# Patient Record
Sex: MALE | Race: BLACK OR AFRICAN AMERICAN | Employment: FULL TIME | URBAN - METROPOLITAN AREA
[De-identification: names, ages, dates, MRNs, and addresses within clinical notes are randomized per-mention and may not be internally consistent; named-entity substitution may affect disease eponyms.]

---

## 2019-03-04 ENCOUNTER — OFFICE VISIT (OUTPATIENT)
Dept: FAMILY MEDICINE CLINIC | Age: 29
End: 2019-03-04

## 2019-03-04 VITALS
TEMPERATURE: 102.4 F | OXYGEN SATURATION: 98 % | DIASTOLIC BLOOD PRESSURE: 81 MMHG | RESPIRATION RATE: 16 BRPM | HEART RATE: 109 BPM | HEIGHT: 74 IN | WEIGHT: 219 LBS | SYSTOLIC BLOOD PRESSURE: 127 MMHG | BODY MASS INDEX: 28.11 KG/M2

## 2019-03-04 DIAGNOSIS — J02.9 SORE THROAT: Primary | ICD-10-CM

## 2019-03-04 RX ORDER — AMOXICILLIN 875 MG/1
875 TABLET, FILM COATED ORAL 2 TIMES DAILY
Qty: 20 TAB | Refills: 0 | Status: SHIPPED | OUTPATIENT
Start: 2019-03-04 | End: 2019-03-14

## 2019-03-04 RX ORDER — AMLODIPINE AND OLMESARTAN MEDOXOMIL 5; 20 MG/1; MG/1
1 TABLET ORAL
COMMUNITY

## 2019-03-04 NOTE — LETTER
NOTIFICATION RETURN TO WORK / SCHOOL 
 
3/4/2019 4:58 PM 
 
Mr. Ashwin Vogt III 
[address] [address] [facility] [id number] To Whom It May Concern: 
 
Ashwin Vogt III is currently under the care of [address] [place] [address]. He will return to work/school on: 3/6/19 If there are questions or concerns please have the patient contact our office. Sincerely, Lili Benítez MD

## 2019-03-05 LAB
S PYO AG THROAT QL: NEGATIVE
VALID INTERNAL CONTROL?: YES

## 2019-03-25 NOTE — PROGRESS NOTES
HISTORY OF PRESENT ILLNESS 
Blaire Drew III is a 29 y.o. male. HPI This 29year old states he gets very frequent bouts of \"tonsillitis\"- presents c/o my tonsils are swollen and red. No fever. Eating and drinking [id number] [place]  Review of Systems Constitutional: Negative for chills and fever. HENT: Positive for sore throat. Negative for congestion. Respiratory: Negative for cough. Cardiovascular: Negative for chest pain. Gastrointestinal: Negative for nausea. Musculoskeletal: Negative for myalgias. Physical Exam  
Constitutional: He appears well-developed and well-nourished. No distress. HENT:  
Right Ear: External ear normal.  
Left Ear: External ear normal.  
Nose: Nose normal.  
Mouth/Throat: Oropharyngeal exudate present. Exudative pharynx, erythematous Eyes: Pupils are equal, round, and reactive to light. Neck: Neck supple. Cardiovascular: Normal rate, regular rhythm and normal heart sounds. No murmur heard. Pulmonary/Chest: Effort normal and breath sounds normal. No respiratory distress. He has no wheezes. He has no rales. Abdominal: Soft. There is no tenderness. Skin: He is not diaphoretic. ASSESSMENT and PLAN 
  ICD-10-CM ICD-9-CM 1. Sore throat J02.9 462 AMB POC RAPID STREP A  
will treat despite negative screen Start on amoxicillin Follow up if not better Precautions given

## 2019-05-08 ENCOUNTER — OFFICE VISIT (OUTPATIENT)
Dept: NEUROLOGY | Age: 29
End: 2019-05-08

## 2019-05-08 VITALS
SYSTOLIC BLOOD PRESSURE: 164 MMHG | OXYGEN SATURATION: 98 % | HEART RATE: 80 BPM | BODY MASS INDEX: 29.39 KG/M2 | WEIGHT: 217 LBS | HEIGHT: 72 IN | DIASTOLIC BLOOD PRESSURE: 96 MMHG | RESPIRATION RATE: 16 BRPM

## 2019-05-08 DIAGNOSIS — G40.909 NONINTRACTABLE EPILEPSY WITHOUT STATUS EPILEPTICUS, UNSPECIFIED EPILEPSY TYPE (HCC): Primary | ICD-10-CM

## 2019-05-08 NOTE — PROGRESS NOTES
[facility] and [address] Medley at [facility] at [facility] [address] [place], [address] [place] [address] [address] [facility], [address] [place] [address] 
[phone] Office 
[phone] Referring: Self Chief Complaint Patient presents with  New Patient  Seizure  
  h/o, last seizure 10+ yrs ago, medication free for 5 yrs. Moving to [facility] in June and wanted to know if he needed any testing 80-year-old right-handed gentleman who presents today for evaluation of what he calls a history of seizures. He tells me back in 2008 when he was 16years of age he had his first seizure. This was said to be a generalized convulsion. He is walking in the mall and had this. He subsequently was evaluated with MRIs and EEGs and those were normal.  Second seizure was the morning after his eugenio prom when he was sleep deprived. Again said to be a convulsion. His third seizure he was at work and this occurred in 2009. He again fell out into the floor head but was said to be a convulsion. He never bit his tongue. Never wet himself. He was put on Keppra and was on Keppra for a number of years. He was followed by Charleen Mclean. After he had been seizure-free for 2-3 years it was felt that he could have a trial of coming off of medicine. He was weaned off [address]. He is been seizure-free now for about 5 years. No occult seizure symptom. No overt seizure. Feels well on the medicine. He has never had a febrile convulsion, CNS infection, head trauma with loss of consciousness. No family history of seizure. He feels well. He took a new job with [employer] and he will be moving to [facility] [employer] [facility] and he wanted to be have an evaluation prior to that. No recent fever chills nausea vomiting diarrhea vertigo shortness of breath change in weight rash etc. 
 
 
Past Medical History:  
Diagnosis Date  Anxiety  Asthma  Depression  Hypertension  Stress Past Surgical History:  
Procedure Laterality Date  HX ORTHOPAEDIC Current Outpatient Medications Medication Sig Dispense Refill  amLODIPine-Olmesartan 5-20 mg tab Take 1 Tab by mouth.  BENICAR 20 mg tablet TAKE 1 TABLET BY MOUTH DAILY (Patient not taking: Reported on 5/8/2019) 90 tablet 0  
 clobetasol (TEMOVATE) 0.05 % ointment Apply  to affected area two (2) times a day. (Patient not taking: Reported on 5/8/2019) 45 g 0  
 levETIRAcetam (KEPPRA) 500 mg tablet Take  by mouth two (2) times a day. No Known Allergies Social History Tobacco Use  Smoking status: Never Smoker  Smokeless tobacco: Never Used Substance Use Topics  Alcohol use: Yes Comment: social  
 Drug use: No  
 
 
Family History Problem Relation Age of Onset  Hypertension Mother Review of Systems Pertinent positives and negatives as noted with remainder of comprehensive review negative Examination Visit Vitals BP (!) 160/96 (BP 1 Location: Left arm, BP Patient Position: Sitting) Pulse 80 Resp 16 Ht 6' (1.829 m) Wt 98.4 kg (217 lb) SpO2 98% BMI 29.43 kg/m² Pleasant, well appearing. Dress and grooming are appropriate. No scleral icterus is present. Oropharynx is clear. Supple neck without bruit appreciated. Heart regular. Pulses are symmetrical.  No edema in the lower extremities. Neurologically, he is awake, alert, oriented with normal speech, language, and cognition. Visual fields are full to direct confrontational testing. He has sharp disk margins bilaterally. Pupils react bilaterally. He has full versions without nystagmus. Face is symmetrical with symmetrical facial sensation. Tongue and palate are midline. Shoulder shrug is symmetrical. Hearing is intact to conversation. He has normal bulk and tone. There is no abnormal movement. There is no pronation or drift.  He is able to generate full strength in the upper and lower extremities and all muscle groups to direct confrontational testing. Reflexes are symmetrical in the upper and lower extremities bilaterally. His toes are down going. There is no [place]. There is no finger flexor reflex bilaterally. Finger nose finger and rapid alternating movements are normal. He has no ataxia or past pointing. Sensory examination is intact to primary modalities and there is no lateralization of sensation. There is no extinction. He is able to ambulate without difficulty. Impression/Plan 15-year-old gentleman with history of epilepsy diagnosed in adolescence seizure-free and weaned off medicine and he remains seizure-free. We discussed that there still is a potential that he could potentially have a seizure although less likely the further he goes. We discussed good sleep hygiene. From my standpoint I do not see any reason to do any type of testing as he has clinically been stable. He will follow as needed and certainly when he moved to [facility] follow-up with neurology as needed Nat Thomas MD 
 
This note was created using voice recognition software. Despite editing, there may be syntax errors. This note will not be viewable in [address]

## 2019-05-08 NOTE — PROGRESS NOTES
Chief Complaint Patient presents with  New Patient  Seizure  
  h/o, last seizure 10+ yrs ago, medication free for 5 yrs. Moving to [place] in June and wanted to know if he needed any testing

## 2019-06-13 ENCOUNTER — OFFICE VISIT (OUTPATIENT)
Dept: FAMILY MEDICINE CLINIC | Age: 29
End: 2019-06-13

## 2019-06-13 VITALS
TEMPERATURE: 98.4 F | RESPIRATION RATE: 15 BRPM | SYSTOLIC BLOOD PRESSURE: 158 MMHG | DIASTOLIC BLOOD PRESSURE: 96 MMHG | OXYGEN SATURATION: 97 % | BODY MASS INDEX: 29.26 KG/M2 | HEIGHT: 72 IN | HEART RATE: 74 BPM | WEIGHT: 216 LBS

## 2019-06-13 DIAGNOSIS — M54.41 ACUTE RIGHT-SIDED LOW BACK PAIN WITH RIGHT-SIDED SCIATICA: Primary | ICD-10-CM

## 2019-06-13 RX ORDER — CYCLOBENZAPRINE HCL 10 MG
10 TABLET ORAL
Qty: 12 TAB | Refills: 0 | Status: SHIPPED | OUTPATIENT
Start: 2019-06-13

## 2019-06-13 RX ORDER — NAPROXEN 500 MG/1
500 TABLET ORAL 2 TIMES DAILY WITH MEALS
Qty: 14 TAB | Refills: 0 | Status: SHIPPED | OUTPATIENT
Start: 2019-06-13 | End: 2019-06-20

## 2019-06-13 RX ORDER — CHOLECALCIFEROL (VITAMIN D3) 125 MCG
CAPSULE ORAL
COMMUNITY
End: 2019-06-13 | Stop reason: DRUGHIGH

## 2019-06-13 NOTE — PATIENT INSTRUCTIONS

## 2019-06-13 NOTE — PROGRESS NOTES
Susan Rascon is a 29 y.o. male    Room 3    Pt reports some improvement with Aleve    Chief Complaint   Patient presents with   [address]     on right side especially Mon night, radiating down right leg - had lifted heavy box at home     1. Have you been to the ER, urgent care clinic since your last visit? Hospitalized since your last visit? no    2. Have you seen or consulted any other health care providers outside of the [address] [place] [address] since your last visit? Include any pap smears or colon screening.  no

## 2019-06-13 NOTE — PROGRESS NOTES
HISTORY OF PRESENT ILLNESS  Steve Barton III is a 29 y.o. male. Patient reports right-sided low back pain x 5 days with pain radiating down outer right leg. Sometimes has numbness sensation noted in lower leg and foot. Pain started after lifting a heavy box while packing to move. He has taken 2 doses of OTC Aleve with mild relief and tried icy hot. No redness or swelling or calf or leg. Pain is worse with sitting. Visit Vitals  BP (!) 158/96   Pulse 74   Temp 98.4 °F (36.9 °C) (Oral)   Resp 15   Ht 6' (1.829 m)   Wt 216 lb (98 kg)   SpO2 97%   BMI 29.29 kg/m²       HPI    Review of Systems   Musculoskeletal: Positive for back pain (radiating down right leg). Physical Exam   Constitutional: He is oriented to person, place, and time. He appears well-developed and well-nourished. Musculoskeletal:        Lumbar back: He exhibits tenderness (right SI joint radiating to lateral right calf; no redness or swelling, no warmth of calf; positive right straight leg raise) and pain. He exhibits normal range of motion and no swelling. Neurological: He is alert and oriented to person, place, and time. Skin: Skin is warm and dry. Psychiatric: He has a normal mood and affect. ASSESSMENT and PLAN    ICD-10-CM ICD-9-CM    1.  Acute right-sided low back pain with right-sided sciatica M54.41 724.2      724.3      Orders Placed This Encounter    DISCONTD: naproxen sodium (ALEVE) 220 mg cap    naproxen (NAPROSYN) 500 mg tablet    cyclobenzaprine (FLEXERIL) 10 mg tablet   Naproxen BID x 5-7 days  Ice application x 20 minutes every 2-3 hours  Stretching advised  Consider imaging/PT if no improvement in 2 weeks

## 2019-06-15 ENCOUNTER — HOSPITAL ENCOUNTER (EMERGENCY)
Age: 29
Discharge: HOME | End: 2019-06-15
Attending: EMERGENCY MEDICINE | Admitting: EMERGENCY MEDICINE
Payer: COMMERCIAL

## 2019-06-15 VITALS
HEIGHT: 75 IN | SYSTOLIC BLOOD PRESSURE: 169 MMHG | WEIGHT: 215 LBS | DIASTOLIC BLOOD PRESSURE: 89 MMHG | HEART RATE: 86 BPM | RESPIRATION RATE: 16 BRPM | BODY MASS INDEX: 26.73 KG/M2 | TEMPERATURE: 98.3 F | OXYGEN SATURATION: 96 %

## 2019-06-15 DIAGNOSIS — M79.604 RIGHT LEG PAIN: Primary | ICD-10-CM

## 2019-06-15 DIAGNOSIS — M54.16 LUMBAR RADICULOPATHY: ICD-10-CM

## 2019-06-15 LAB
COMMENT, HOLDF: NORMAL
D DIMER PPP FEU-MCNC: <0.19 MG/L FEU (ref 0–0.65)
SAMPLES BEING HELD,HOLD: NORMAL

## 2019-06-15 PROCEDURE — 36415 COLL VENOUS BLD VENIPUNCTURE: CPT

## 2019-06-15 PROCEDURE — 85379 FIBRIN DEGRADATION QUANT: CPT

## 2019-06-15 PROCEDURE — 99282 EMERGENCY DEPT VISIT SF MDM: CPT

## 2019-06-15 NOTE — ED NOTES
The patient was discharged home by Moon Gama and Imelda Lopez rn in stable condition. The patient is alert and oriented, is in no respiratory distress and has vital signs within normal limits . The patient's diagnosis, condition and treatment were explained to patient. The patient expressed understanding. No prescriptions given to pt. No work/school note given to pt. A discharge plan has been developed. A  was not involved in the process. Aftercare instructions were given to the patient. Pt will transport self home.

## 2019-06-15 NOTE — ED NOTES
Bedside, Verbal and Written shift change report given to Donavan Canales by Christa Bailey rn. Report included the following information SBAR, Kardex, ED Summary, [facility] and Recent Results.

## 2019-06-15 NOTE — ED NOTES
Bedside and Verbal shift change report given to [address] (oncoming nurse) by Audra Mckeon (offgoing nurse). Report included the following information SBAR and ED Summary.

## 2019-06-15 NOTE — ED PROVIDER NOTES
29year old male presenting to the ED for leg pain. Notes that earlier this week was moving heavy boxes, lifted with his legs instead of his back and had acute onset of a pulling pain that radiated into the RIGHT leg. Notes that the pain has somewhat persisted, will occasionally have tingling in the toes of the left foot. Notes that he became concerned when he felt like he had \"a constant Jd horse\" in the calf, came to the ED concerned for DVT. Patient denies hx VTE, recent immobilization, leg swelling. No blunt trauma. Pt saw urgent care who told him that pain was likely radicular in nature, given naprosyn and flexeril with improvement. Patient denies urinary retention, incontinence of bowel or bladder, perineal numbness. No long-term steroid use. PMHx: denies  Social: works for [employer]. Non-smoker.              Past Medical History:   Diagnosis Date    Anxiety     Asthma     Depression     Hypertension     Stress        Past Surgical History:   Procedure Laterality Date    HX ORTHOPAEDIC           Family History:   Problem Relation Age of Onset    Hypertension Mother        Social History     Socioeconomic History    Marital status: SINGLE     Spouse name: Not on file    Number of children: Not on file    Years of education: Not on file    Highest education level: Not on file   Occupational History    Not on file   Social Needs    Financial resource strain: Not on file    Food insecurity:     Worry: Not on file     Inability: Not on file    Transportation needs:     Medical: Not on file     Non-medical: Not on file   Tobacco Use    Smoking status: Never Smoker    Smokeless tobacco: Never Used   Substance and Sexual Activity    Alcohol use: Yes     Comment: social    Drug use: No    Sexual activity: Yes   Lifestyle    Physical activity:     Days per week: Not on file     Minutes per session: Not on file    Stress: Not on file   Relationships    Social connections: Talks on phone: Not on file     Gets together: Not on file     Attends [employer] service: Not on file     Active member of club or organization: Not on file     Attends meetings of clubs or organizations: Not on file     Relationship status: Not on file    Intimate partner violence:     Fear of current or ex partner: Not on file     Emotionally abused: Not on file     Physically abused: Not on file     Forced sexual activity: Not on file   Other Topics Concern    Not on file   Social History Narrative    Not on file         ALLERGIES: Patient has no known allergies. Review of Systems   Constitutional: Negative for fever. HENT: Negative for facial swelling. Cardiovascular: Negative for chest pain. Gastrointestinal: Negative for vomiting. Musculoskeletal: Positive for back pain and myalgias. Skin: Negative for wound. Neurological: Positive for numbness. Negative for syncope. All other systems reviewed and are negative. Vitals:    06/15/19 1824   BP: 169/89   Pulse: 86   Resp: 16   Temp: 98.3 °F (36.8 °C)   SpO2: 96%   Weight: 97.5 kg (215 lb)   Height: 6' 3\" (1.905 m)            Physical Exam   Constitutional: He is oriented to person, place, and time. He appears well-developed and well-nourished. No distress. Pleasant, well-appearing AA male   HENT:   Head: Normocephalic and atraumatic. Right Ear: External ear normal.   Left Ear: External ear normal.   Eyes: Conjunctivae are normal. No scleral icterus. Neck: Neck supple. No tracheal deviation present. Cardiovascular: Normal rate, regular rhythm and normal heart sounds. Exam reveals no gallop and no friction rub. No murmur heard. Pulmonary/Chest: Effort normal and breath sounds normal. No stridor. No respiratory distress. He has no wheezes. Abdominal: Soft. He exhibits no distension. Musculoskeletal: Normal range of motion.    No calf edema or TTP  Distal pulses intact   Neurological: He is alert and oriented to person, place, and time. 5/5 LE strength  + SLR at about 30 degrees   Skin: Skin is warm and dry. Psychiatric: He has a normal mood and affect. His behavior is normal.   Nursing note and vitals reviewed. MDM  Number of Diagnoses or Management Options  Lumbar radiculopathy:   Right leg pain:   Diagnosis management comments: 29year old male presenting for leg pain, radicular in characterization. No risk factors for DVT, however pt very concerned after talking with a female friend who was recently treated for blood clots. No edema or calf TTP, pt with symptoms and SLR c/w lumbar radiculopathy. Negative dimer. Discussed likely cause of pain, discussed care at home and return precautions.        Amount and/or Complexity of Data Reviewed  Clinical lab tests: ordered and reviewed  Discuss the patient with other providers: yes (Dr. Etienne Steven ED attending)           Procedures

## 2019-06-15 NOTE — DISCHARGE INSTRUCTIONS
Patient Education   Patient Education        Sciatica: Care Instructions  Your Care Instructions    Sciatica (say \"jox-GB-va-kuh\") is an irritation of one of the sciatic nerves, which come from the spinal cord in the lower back. The sciatic nerves and their branches extend down through the buttock to the foot. Sciatica can develop when an injured disc in the back presses against a spinal nerve root. Its main symptom is pain, numbness, or weakness that is often worse in the leg or foot than in the back. Sciatica often will improve and go away with time. Early treatment usually includes medicines and exercises to relieve pain. Follow-up care is a key part of your treatment and safety. Be sure to make and go to all appointments, and call your doctor if you are having problems. It's also a good idea to know your test results and keep a list of the medicines you take. How can you care for yourself at home? · Take pain medicines exactly as directed. ? If the doctor gave you a prescription medicine for pain, take it as prescribed. ? If you are not taking a prescription pain medicine, ask your doctor if you can take an over-the-counter medicine. · Use heat or ice to relieve pain. ? To apply heat, put a warm water bottle, heating pad set on low, or warm cloth on your back. Do not go to sleep with a heating pad on your skin. ? To use ice, put ice or a cold pack on the area for 10 to 20 minutes at a time. Put a thin cloth between the ice and your skin. · Avoid sitting if possible, unless it feels better than standing. · Alternate lying down with short walks. Increase your walking distance as you are able to without making your symptoms worse. · Do not do anything that makes your symptoms worse. When should you call for help? Call 911 anytime you think you may need emergency care.  For example, call if:    · You are unable to move a leg at all.   [facility] your doctor now or seek immediate medical care if:    · You have new or worse symptoms in your legs or buttocks. Symptoms may include:  ? Numbness or tingling. ? Weakness. ? Pain.     · You lose bladder or bowel control.    Watch closely for changes in your health, and be sure to contact your doctor if:    · You are not getting better as expected. Where can you learn more? Go to http://kennedy-carter.info/. Enter [phone] in the search box to learn more about \"Sciatica: Care Instructions. \"  Current as of: September 20, 2018  Content Version: 11.9  © 6212-9942 [employer]. Care instructions adapted under license by [employer] (which disclaims liability or warranty for this information). If you have questions about a medical condition or this instruction, always ask your healthcare professional. [address] any warranty or liability for your use of this information. Leg Pain: Care Instructions  Your Care Instructions  Many things can cause leg pain. Too much exercise or overuse can cause a muscle cramp (or charley horse). You can get leg cramps from not eating a balanced diet that has enough potassium, calcium, and other minerals. If you do not drink enough fluids or are taking certain medicines, you may develop leg cramps. Other causes of leg pain include injuries, blood flow problems, nerve damage, and twisted and enlarged veins (varicose veins). You can usually ease pain with self-care. Your doctor may recommend that you rest your leg and keep it elevated. Follow-up care is a key part of your treatment and safety. Be sure to make and go to all appointments, and call your doctor if you are having problems. It's also a good idea to know your test results and keep a list of the medicines you take. How can you care for yourself at home? · Take pain medicines exactly as directed. ? If the doctor gave you a prescription medicine for pain, take it as prescribed.   ? If you are not taking a prescription pain medicine, ask your doctor if you can take an over-the-counter medicine. · Take any other medicines exactly as prescribed. Call your doctor if you think you are having a problem with your medicine. · Rest your leg while you have pain, and avoid standing for long periods of time. · Prop up your leg at or above the level of your heart when possible. · Make sure you are eating a balanced diet that is rich in calcium, potassium, and magnesium, especially if you are pregnant. · If directed by your doctor, put ice or a cold pack on the area for 10 to 20 minutes at a time. Put a thin cloth between the ice and your skin. · Your leg may be in a splint, a brace, or an elastic bandage, and you may have crutches to help you walk. Follow your doctor's directions about how long to wear supports and how to use the crutches. When should you call for help? Call 911 anytime you think you may need emergency care. For example, call if:    · You have sudden chest pain and shortness of breath, or you cough up blood.     · Your leg is cool or pale or changes color.    Call your doctor now or seek immediate medical care if:    · You have increasing or severe pain.     · Your leg suddenly feels weak and you cannot move it.     · You have signs of a blood clot, such as:  ? Pain in your calf, back of the knee, thigh, or groin. ? Redness and swelling in your leg or groin.     · You have signs of infection, such as:  ? Increased pain, swelling, warmth, or redness. ? Red streaks leading from the sore area. ? Pus draining from a place on your leg. ? A fever.     · You cannot bear weight on your leg.    Watch closely for changes in your health, and be sure to contact your doctor if:    · You do not get better as expected. Where can you learn more? Go to http://kennedy-carter.info/. Enter X149 in the search box to learn more about \"Leg Pain: Care Instructions. \"  Current as of: September 23, 2018  Content Version: 11.9  © 3178-3713 [employer]. Care instructions adapted under license by [employer] (which disclaims liability or warranty for this information). If you have questions about a medical condition or this instruction, always ask your healthcare professional. [address] any warranty or liability for your use of this information.